# Patient Record
Sex: FEMALE | Race: OTHER | Employment: UNEMPLOYED | URBAN - METROPOLITAN AREA
[De-identification: names, ages, dates, MRNs, and addresses within clinical notes are randomized per-mention and may not be internally consistent; named-entity substitution may affect disease eponyms.]

---

## 2021-04-29 PROCEDURE — 96374 THER/PROPH/DIAG INJ IV PUSH: CPT

## 2021-04-29 PROCEDURE — 99284 EMERGENCY DEPT VISIT MOD MDM: CPT

## 2021-04-29 RX ORDER — ATORVASTATIN CALCIUM 20 MG/1
20 TABLET, FILM COATED ORAL NIGHTLY
COMMUNITY

## 2021-04-29 RX ORDER — LOSARTAN POTASSIUM 25 MG/1
25 TABLET ORAL DAILY
COMMUNITY

## 2021-04-30 ENCOUNTER — APPOINTMENT (OUTPATIENT)
Dept: CT IMAGING | Facility: HOSPITAL | Age: 76
End: 2021-04-30
Attending: EMERGENCY MEDICINE
Payer: MEDICAID

## 2021-04-30 ENCOUNTER — HOSPITAL ENCOUNTER (EMERGENCY)
Facility: HOSPITAL | Age: 76
Discharge: HOME | End: 2021-04-30
Attending: EMERGENCY MEDICINE
Payer: MEDICAID

## 2021-04-30 VITALS
WEIGHT: 130 LBS | HEART RATE: 56 BPM | HEIGHT: 61 IN | SYSTOLIC BLOOD PRESSURE: 140 MMHG | DIASTOLIC BLOOD PRESSURE: 85 MMHG | RESPIRATION RATE: 16 BRPM | OXYGEN SATURATION: 97 % | BODY MASS INDEX: 24.55 KG/M2 | TEMPERATURE: 97 F

## 2021-04-30 DIAGNOSIS — R10.9 ABDOMINAL PAIN, ACUTE: Primary | ICD-10-CM

## 2021-04-30 DIAGNOSIS — K44.9 HIATAL HERNIA: ICD-10-CM

## 2021-04-30 PROCEDURE — 85025 COMPLETE CBC W/AUTO DIFF WBC: CPT | Performed by: EMERGENCY MEDICINE

## 2021-04-30 PROCEDURE — 81001 URINALYSIS AUTO W/SCOPE: CPT | Performed by: EMERGENCY MEDICINE

## 2021-04-30 PROCEDURE — 80048 BASIC METABOLIC PNL TOTAL CA: CPT | Performed by: EMERGENCY MEDICINE

## 2021-04-30 PROCEDURE — 74177 CT ABD & PELVIS W/CONTRAST: CPT | Performed by: EMERGENCY MEDICINE

## 2021-04-30 RX ORDER — KETOROLAC TROMETHAMINE 10 MG/1
10 TABLET, FILM COATED ORAL EVERY 6 HOURS PRN
Qty: 20 TABLET | Refills: 0 | Status: SHIPPED | OUTPATIENT
Start: 2021-04-30 | End: 2021-04-30

## 2021-04-30 RX ORDER — MORPHINE SULFATE 4 MG/ML
2 INJECTION, SOLUTION INTRAMUSCULAR; INTRAVENOUS ONCE
Status: COMPLETED | OUTPATIENT
Start: 2021-04-30 | End: 2021-04-30

## 2021-04-30 RX ORDER — DICYCLOMINE HCL 20 MG
20 TABLET ORAL 4 TIMES DAILY PRN
Qty: 30 TABLET | Refills: 0 | Status: SHIPPED | OUTPATIENT
Start: 2021-04-30 | End: 2021-05-30

## 2021-04-30 NOTE — ED PROVIDER NOTES
Patient Seen in: [facility] AND [facility] Emergency Department      History   Patient presents with:  Abdomen/Flank Pain    Stated Complaint: hernia pain    HPI/Subjective:   HPI    53-year-old female with history of hypertension, diabetes, abdominal hernia, he °C) (Temporal)   Resp 16   Ht 154.9 cm (5' 1\")   Wt 59 kg   SpO2 97%   BMI 24.56 kg/m²         Physical Exam  Vitals and nursing note reviewed. HENT:      Head: Normocephalic.       Mouth/Throat:      Mouth: Mucous membranes are moist.   Eyes:      Extra Gold Auto Resulted    CBC W/ DIFFERENTIAL    Collection Time: 04/30/21 12:36 AM   Result Value Ref Range    WBC 11.8 (H) 4.0 - 11.0 x10(3) uL    RBC 4.66 3.80 - 5.30 x10(6)uL    HGB 12.7 12.0 - 16.0 g/dL    HCT 40.0 35.0 - 48.0 %    MCV 85.8 80.0 - 100.0 f steatosis. Small fat-containing left inguinal hernia. Mild lumbar spondylosis. The results were faxed/finalized only at [id number] ET. If you would like to discuss this case directly please call [phone] (extension [phone]).   If you can't reach me at this MAKING:  After obtaining the patient's history, performing the physical exam and reviewing the diagnostics, multiple initial diagnoses were considered based on the presenting problem including gastroenteritis, obstruction, diverticulitis.